# Patient Record
(demographics unavailable — no encounter records)

---

## 2022-01-01 NOTE — NUR
SE RECIBE NEONATO ALERTA ACOMPANADO DE MADRE Y PADRE REFERIDO POR DR PARRISH
POR NIVELES DE BILIRRUBINA ALTOS. SE MIDEN S/V. SE CONSULTA CON DR ALEXIS
ESTADO DE PACIENTE Y SE COLOCA EN FALLON PEDIATRICA PENDIENTE A EVALUACION
MEDICA PARA TRASLADO A NICU.